# Patient Record
Sex: MALE | Race: ASIAN | NOT HISPANIC OR LATINO | Employment: OTHER | URBAN - METROPOLITAN AREA
[De-identification: names, ages, dates, MRNs, and addresses within clinical notes are randomized per-mention and may not be internally consistent; named-entity substitution may affect disease eponyms.]

---

## 2023-04-22 ENCOUNTER — OFFICE VISIT (OUTPATIENT)
Dept: FAMILY MEDICINE | Facility: CLINIC | Age: 78
End: 2023-04-22
Payer: COMMERCIAL

## 2023-04-22 ENCOUNTER — APPOINTMENT (OUTPATIENT)
Dept: RADIOLOGY | Facility: HOSPITAL | Age: 78
End: 2023-04-22
Attending: EMERGENCY MEDICINE
Payer: COMMERCIAL

## 2023-04-22 ENCOUNTER — HOSPITAL ENCOUNTER (EMERGENCY)
Facility: HOSPITAL | Age: 78
Discharge: HOME | End: 2023-04-22
Attending: EMERGENCY MEDICINE | Admitting: EMERGENCY MEDICINE
Payer: COMMERCIAL

## 2023-04-22 VITALS
RESPIRATION RATE: 33 BRPM | SYSTOLIC BLOOD PRESSURE: 180 MMHG | WEIGHT: 140 LBS | DIASTOLIC BLOOD PRESSURE: 85 MMHG | OXYGEN SATURATION: 96 % | TEMPERATURE: 98.6 F | HEART RATE: 74 BPM

## 2023-04-22 VITALS
DIASTOLIC BLOOD PRESSURE: 92 MMHG | SYSTOLIC BLOOD PRESSURE: 183 MMHG | HEART RATE: 74 BPM | TEMPERATURE: 98.4 F | OXYGEN SATURATION: 96 %

## 2023-04-22 DIAGNOSIS — Z76.89 REFERRAL OF PATIENT: ICD-10-CM

## 2023-04-22 DIAGNOSIS — I10 ASYMPTOMATIC HYPERTENSION: ICD-10-CM

## 2023-04-22 DIAGNOSIS — J20.9 ACUTE BRONCHITIS WITH WHEEZING: ICD-10-CM

## 2023-04-22 DIAGNOSIS — I16.1 HYPERTENSIVE EMERGENCY: Primary | ICD-10-CM

## 2023-04-22 DIAGNOSIS — R05.1 ACUTE COUGH: ICD-10-CM

## 2023-04-22 LAB
ANION GAP SERPL CALCULATED.3IONS-SCNC: 10 MMOL/L (ref 7–15)
BUN SERPL-MCNC: 16.2 MG/DL (ref 8–23)
CALCIUM SERPL-MCNC: 8.7 MG/DL (ref 8.8–10.2)
CHLORIDE SERPL-SCNC: 100 MMOL/L (ref 98–107)
CREAT SERPL-MCNC: 0.97 MG/DL (ref 0.67–1.17)
DEPRECATED HCO3 PLAS-SCNC: 25 MMOL/L (ref 22–29)
ERYTHROCYTE [DISTWIDTH] IN BLOOD BY AUTOMATED COUNT: 13.1 % (ref 10–15)
GFR SERPL CREATININE-BSD FRML MDRD: 79 ML/MIN/1.73M2
GLUCOSE SERPL-MCNC: 113 MG/DL (ref 70–99)
HCT VFR BLD AUTO: 44.7 % (ref 40–53)
HGB BLD-MCNC: 14.8 G/DL (ref 13.3–17.7)
MCH RBC QN AUTO: 30 PG (ref 26.5–33)
MCHC RBC AUTO-ENTMCNC: 33.1 G/DL (ref 31.5–36.5)
MCV RBC AUTO: 91 FL (ref 78–100)
PLATELET # BLD AUTO: 320 10E3/UL (ref 150–450)
POTASSIUM SERPL-SCNC: 4.5 MMOL/L (ref 3.4–5.3)
RBC # BLD AUTO: 4.94 10E6/UL (ref 4.4–5.9)
SODIUM SERPL-SCNC: 135 MMOL/L (ref 136–145)
WBC # BLD AUTO: 10.4 10E3/UL (ref 4–11)

## 2023-04-22 PROCEDURE — 36415 COLL VENOUS BLD VENIPUNCTURE: CPT | Performed by: EMERGENCY MEDICINE

## 2023-04-22 PROCEDURE — 71046 X-RAY EXAM CHEST 2 VIEWS: CPT

## 2023-04-22 PROCEDURE — 85018 HEMOGLOBIN: CPT | Performed by: EMERGENCY MEDICINE

## 2023-04-22 PROCEDURE — 80048 BASIC METABOLIC PNL TOTAL CA: CPT | Performed by: EMERGENCY MEDICINE

## 2023-04-22 PROCEDURE — 250N000013 HC RX MED GY IP 250 OP 250 PS 637: Performed by: EMERGENCY MEDICINE

## 2023-04-22 PROCEDURE — 99205 OFFICE O/P NEW HI 60 MIN: CPT | Performed by: PHYSICIAN ASSISTANT

## 2023-04-22 PROCEDURE — 99285 EMERGENCY DEPT VISIT HI MDM: CPT | Mod: 25

## 2023-04-22 PROCEDURE — 93005 ELECTROCARDIOGRAM TRACING: CPT | Performed by: EMERGENCY MEDICINE

## 2023-04-22 RX ORDER — LISINOPRIL 5 MG/1
5 TABLET ORAL DAILY
Qty: 14 TABLET | Refills: 0 | Status: SHIPPED | OUTPATIENT
Start: 2023-04-22

## 2023-04-22 RX ORDER — LISINOPRIL 5 MG/1
5 TABLET ORAL ONCE
Status: COMPLETED | OUTPATIENT
Start: 2023-04-22 | End: 2023-04-22

## 2023-04-22 RX ORDER — LISINOPRIL 5 MG/1
1 TABLET ORAL
COMMUNITY
Start: 2023-03-30

## 2023-04-22 RX ORDER — AZITHROMYCIN 250 MG/1
TABLET, FILM COATED ORAL
Qty: 6 TABLET | Refills: 0 | Status: SHIPPED | OUTPATIENT
Start: 2023-04-22 | End: 2023-04-27

## 2023-04-22 RX ADMIN — LISINOPRIL 5 MG: 5 TABLET ORAL at 22:31

## 2023-04-22 ASSESSMENT — ENCOUNTER SYMPTOMS
NAUSEA: 0
WHEEZING: 1
COUGH: 1
VOMITING: 0

## 2023-04-22 ASSESSMENT — ACTIVITIES OF DAILY LIVING (ADL): ADLS_ACUITY_SCORE: 33

## 2023-04-23 NOTE — ED PROVIDER NOTES
EMERGENCY DEPARTMENT ENCOUNTER      NAME: Toby Urias  AGE: 78 year old male  YOB: 1945  MRN: [id number]  EVALUATION DATE & TIME: 4/22/2023  8:41 PM    PCP: No Ref-Primary, Physician    ED PROVIDER: Jatin Davison M.D.      Chief Complaint   Patient presents with     Hypertension     Cough         FINAL IMPRESSION:  1. Acute cough    2. Asymptomatic hypertension          ED COURSE & MEDICAL DECISION MAKING:    Pertinent Labs & Imaging studies reviewed below.  All EKGs below represent my independent interpretation.   ED Course as of 04/22/23 2253   Sat Apr 22, 2023 2109 The patient is a 78-year-old gentleman with history of hypertension who presents with persistent cough, low-grade fever for the past 6 days.  Here with family, he is also been out of his home blood pressure medications since visiting from [place] for the past 2 weeks.  On arrival here he is hypertensive to 180/84, normal temperature.  He has occasional rhonchi in the right lung fields, but is not in respiratory distress, he is not tachypneic or hypoxic.  He has no leg swelling, he does not clinically hypervolemic.  Very unlikely that he is experiencing CHF episode, plan to get an x-ray to screen for pneumonia.  Basic blood work to screen for evidence of kidney injury, leukocytosis.  EKG to screen for underlying arrhythmia or signs of ischemic changes on EKG.  He denies any chest pain.  Atypical incredibly unlikely, troponin would not be helpful at this time.   2150 WBC: 10.4  No significant leukocytosis   2150 Based on my interpretation of the x-ray, there is no focal pneumonia.  No pleural effusions.   2154 EKG shows sinus rhythm with a rate of 77.  Incomplete right bundle branch block.  No acute ischemic ST or T wave morphology.  Normal axis, normal intervals.  No prior EKG for comparison.  Impression: Normal sinus rhythm with a rate of 77.   2155 Creatinine: 0.97  Normal creatinine   2204 Given progression of cough over the past 6  days, plan to treat for community-acquired pneumonia with azithromycin.  Sent home with 2 weeks of home lisinopril.  Given 1 dose here.  His blood pressure is 180/85 prior to being given lisinopril.  Hypertension here is asymptomatic.     Was able to review his outpatient clinic chart on 330, 2023 at [facility].  I confirmed his lisinopril dosage.    Additional ED Course Timestamps:  8:59 PM I met with the patient to obtain patient history and performed a physical exam. Discussed plan for ED work up including potential diagnostic studies and interventions.  10:08 PM Reevaluated and updated the patient. We discussed the plan for discharge and the patient is agreeable. Reviewed supportive cares, symptomatic treatment, outpatient follow up, and reasons to return to the Emergency Department. Patient to be discharged by ED RN.       Medical Decision Making    History:    Supplemental history from: Family Member/Significant Other    External Record(s) reviewed: Documented in chart, if applicable.    Work Up:    Chart documentation includes differential considered and any EKGs or imaging independently interpreted by provider, where specified.    In additional to work up documented, I considered the following work up: Documented in chart, if applicable.    External consultation:    Discussion of management with another provider: Documented in chart, if applicable    Complicating factors:    Care impacted by chronic illness: Hyperlipidemia and Hypertension    Care affected by social determinants of health: N/A    Disposition considerations: Discharge. I prescribed additional prescription strength medication(s) as charted. I considered admission, but discharged the patient after share decision making conversation.        At the conclusion of the encounter I discussed the results of all of the tests and the disposition. The questions were answered. The patient or family acknowledged understanding and was agreeable with  the care plan.       MEDICATIONS GIVEN IN THE EMERGENCY:  Medications   lisinopril (ZESTRIL) tablet 5 mg (5 mg Oral $Given 4/22/23 2231)         NEW PRESCRIPTIONS STARTED AT TODAY'S ER VISIT  Discharge Medication List as of 4/22/2023 10:08 PM      START taking these medications    Details   azithromycin (ZITHROMAX) 250 MG tablet Take 2 tablets (500 mg) by mouth daily for 1 day, THEN 1 tablet (250 mg) daily for 4 days., Disp-6 tablet, R-0, E-Prescribe      !! lisinopril (ZESTRIL) 5 MG tablet Take 1 tablet (5 mg) by mouth daily, Disp-14 tablet, R-0, E-Prescribe       !! - Potential duplicate medications found. Please discuss with provider.             =================================================================    HPI    Patient information was obtained from: patient's daughter    Use of : N/A        Toby Urias is a 78 year old male with a pertinent history of hyperlipidemia and benign essential hypertension who presents to this ED for evaluation of hypertension and cough.    Patient's daughter reports that patient is visiting from [place]. Patient did not bring any of his hypertension medications and has not been taking it for 2 weeks. Daughter says for the past few days, patient has had high blood pressure reads. Patient will be returning on Monday (4/24/23) and daughter just wants him to be evaluated prior to return trip.    Daughter also reports that patient has had a cough and some wheezing since Monday (4/17/2023). She has been giving him his inhalers and OTC cough medications.     He denies associating generalized weakness, nausea, vomiting, chest pain, and lower extremity edema. He denies recent sick contacts.    There were no other concerns/complaints at this time.               REVIEW OF SYSTEMS   Review of Systems   Constitutional:        Endorses subjective fever. Denies generalized weakness.   Respiratory: Positive for cough and wheezing.    Cardiovascular: Negative for chest pain and leg  swelling.   Gastrointestinal: Negative for nausea and vomiting.   All other systems reviewed and are negative.       VITALS:  BP (!) 180/85   Pulse 74   Temp 98.6  F (37  C) (Oral)   Resp (!) 33   Wt 63.5 kg (140 lb)   SpO2 96%     PHYSICAL EXAM    Constitutional: Well developed, well nourished. Comfortable appearing.  HENT: Normocephalic, atraumatic, mucous membranes moist, nose normal. Neck- Supple, gross ROM intact.   Eyes: Pupils mid-range, conjunctiva without injection, no discharge.   Respiratory: Occasional light rhonchi in right anterior lung fields. No respiratory distress, speaks full sentences easily.   Cardiovascular: Normal heart rate, regular rhythm, no murmurs.   GI: Soft, no tenderness to deep palpation in all quadrants, no masses.  Musculoskeletal: Moving all 4 extremities intentionally and without pain. No obvious deformity.  Skin: Warm, dry, no rash.  Neurologic: Alert & oriented x 3, cranial nerves grossly intact.  Psychiatric: Affect normal, cooperative.      I, Mary oGdinez am serving as a scribe to document services personally performed by Dr. Jatin Davison based on my observation and the provider's statements to me. I, Jatin Davison MD attest that Mary Godinez is acting in a scribe capacity, has observed my performance of the services and has documented them in accordance with my direction.    Jatin Davison M.D.  Emergency Medicine  [facility] EMERGENCY DEPARTMENT  [facility] [facility] [id number] [phone]  Dept: [phone]     Jatin Davison MD  04/22/23 6673

## 2023-04-23 NOTE — ED TRIAGE NOTES
Pt has not taken his B/P medication for past 2 weeks.  Pt is visiting from [place] and forgot to bring his medication.  B/P was 200 systolic at urgent care p[facility].  They had pt lay down for 10 minutes and rechecked it at urgent care and it was 180s systolic.  In triage, B/P is 179/84.  Pt also c/o cough since Monday, and at urgent care pta, the provider heard wheezing when listening to his lung sounds.  Pt denies headache, denies blurry vision, denies nausea, denies vomiting, denies shortness of breath.     Triage Assessment     Row Name 04/22/23 2033       Triage Assessment (Adult)    Airway WDL WDL       Respiratory WDL    Respiratory WDL X;cough       Skin Circulation/Temperature WDL    Skin Circulation/Temperature WDL WDL       Cardiac WDL    Cardiac WDL WDL       Peripheral/Neurovascular WDL    Peripheral Neurovascular WDL WDL       Cognitive/Neuro/Behavioral WDL    Cognitive/Neuro/Behavioral WDL WDL

## 2023-04-23 NOTE — PROGRESS NOTES
Assessment & Plan     Hypertensive emergency  -Patient with cough and wheezing.  His blood pressure sitting was 201/92, supine blood pressure was 183/92.  I have referred patient to the emergency room for hypertensive emergency.  Patient has not taken his blood pressure for 2 weeks.  I am also concerned about congestive heart failure.    Acute bronchitis with wheezing  -Patient is wheezing on lung exam.    Referral of patient  -Patient is referred to the emergency room at the [facility]       Patient Instructions   Referral to Emergency Room      Return for Follow up, with  PCP.    At the end of the encounter, I discussed results, diagnosis, medications. Discussed red flags for immediate return to clinic/ER, as well as indications for follow up if no improvement. Patient understood and agreed to plan. Patient was stable for discharge.    Chela Luis is a 78 year old male who presents to clinic today with daughter for the following health issues:  Chief Complaint   Patient presents with     Cough     Cough for the past week. Negative covid test.      HPI    Patient`s daughter reports cough one week. Patient has fever and elevated blood pressure. Patient coughs more when laying down. Cough is productive. Patient reports not taking his blood pressure medication for two weeks. Daughter reports patient forgot the medication at home in [place]. He takes lisinopril 5mg.  He last saw his PCP on 03/30/2023 and blood pressure was 136/77. He has been taking delsym and albuterol from his grandchild prescription for cough which do not help. He denies chills, shortness of breath and wheezing, dizziness. No history of Asthma.      Review of Systems    Problem List:  Hyperlipidemia  Benign Essential Hypertension      No past medical history on file.    Social History     Tobacco Use     Smoking status: Not on file     Smokeless tobacco: Not on file   Substance Use Topics     Alcohol use: Not on  file           Objective    BP (!) 201/92   Pulse 79   Temp 98.4  F (36.9  C)   SpO2 96%   Physical Exam  Constitutional:       Appearance: Normal appearance.   HENT:      Head: Normocephalic.      Mouth/Throat:      Mouth: Mucous membranes are moist.      Pharynx: Oropharynx is clear. Uvula midline. No posterior oropharyngeal erythema.   Cardiovascular:      Rate and Rhythm: Normal rate and regular rhythm.   Pulmonary:      Effort: Pulmonary effort is normal.      Breath sounds: Examination of the right-upper field reveals wheezing. Examination of the left-upper field reveals wheezing. Examination of the right-middle field reveals wheezing. Examination of the left-middle field reveals wheezing. Examination of the right-lower field reveals wheezing. Examination of the left-lower field reveals wheezing. Wheezing present.   Lymphadenopathy:      Head:      Right side of head: No submental, submandibular or tonsillar adenopathy.      Left side of head: No submental, submandibular or tonsillar adenopathy.      Cervical: No cervical adenopathy.      Right cervical: No superficial cervical adenopathy.     Left cervical: No superficial cervical adenopathy.   Skin:     General: Skin is warm and dry.      Findings: No rash.   Neurological:      Mental Status: He is alert.   Psychiatric:         Mood and Affect: Mood normal.         Behavior: Behavior normal.              Ana María Vidales PA-C

## 2023-04-23 NOTE — DISCHARGE INSTRUCTIONS
The chest x-ray was clear, I do not see an obvious pneumonia, however if your cough is getting worse I think it is best to treat this as an early pneumonia with a few days of azithromycin.  I sent this to the pharmacy to  tomorrow morning.    I also sent home with 2 weeks of lisinopril.  Even though you took 1 dose before you left the ER tonight, you can take your next dose in the morning.

## 2023-04-24 LAB
ATRIAL RATE - MUSE: 77 BPM
DIASTOLIC BLOOD PRESSURE - MUSE: 93 MMHG
INTERPRETATION ECG - MUSE: NORMAL
P AXIS - MUSE: 54 DEGREES
PR INTERVAL - MUSE: 162 MS
QRS DURATION - MUSE: 80 MS
QT - MUSE: 376 MS
QTC - MUSE: 425 MS
R AXIS - MUSE: 45 DEGREES
SYSTOLIC BLOOD PRESSURE - MUSE: 193 MMHG
T AXIS - MUSE: 53 DEGREES
VENTRICULAR RATE- MUSE: 77 BPM